# Patient Record
Sex: FEMALE | Race: WHITE
[De-identification: names, ages, dates, MRNs, and addresses within clinical notes are randomized per-mention and may not be internally consistent; named-entity substitution may affect disease eponyms.]

---

## 2017-03-23 ENCOUNTER — HOSPITAL ENCOUNTER (OUTPATIENT)
Dept: HOSPITAL 45 - C.PAPS | Age: 22
Discharge: HOME | End: 2017-03-23
Attending: OBSTETRICS & GYNECOLOGY
Payer: COMMERCIAL

## 2017-03-23 DIAGNOSIS — Z12.4: ICD-10-CM

## 2017-03-23 DIAGNOSIS — D06.9: Primary | ICD-10-CM

## 2017-09-05 ENCOUNTER — HOSPITAL ENCOUNTER (OUTPATIENT)
Dept: HOSPITAL 45 - C.PAPS | Age: 22
Discharge: HOME | End: 2017-09-05
Attending: OBSTETRICS & GYNECOLOGY
Payer: COMMERCIAL

## 2017-09-05 DIAGNOSIS — D06.9: Primary | ICD-10-CM

## 2018-05-05 ENCOUNTER — HOSPITAL ENCOUNTER (EMERGENCY)
Dept: HOSPITAL 45 - C.EDB | Age: 23
Discharge: HOME | End: 2018-05-05
Payer: COMMERCIAL

## 2018-05-05 VITALS — DIASTOLIC BLOOD PRESSURE: 78 MMHG | OXYGEN SATURATION: 99 % | HEART RATE: 82 BPM | SYSTOLIC BLOOD PRESSURE: 133 MMHG

## 2018-05-05 VITALS
BODY MASS INDEX: 26.89 KG/M2 | BODY MASS INDEX: 26.89 KG/M2 | WEIGHT: 136.95 LBS | WEIGHT: 136.95 LBS | HEIGHT: 60 IN | HEIGHT: 60 IN

## 2018-05-05 VITALS — TEMPERATURE: 98.24 F

## 2018-05-05 DIAGNOSIS — Z79.899: ICD-10-CM

## 2018-05-05 DIAGNOSIS — N39.0: Primary | ICD-10-CM

## 2018-05-05 NOTE — EMERGENCY ROOM VISIT NOTE
History


First contact with patient:  04:53


Chief Complaint:  URINARY SYMPTOMS


Stated Complaint:  UTI SYMPTOMS,BLOOD/TISSUE IN URINE


Nursing Triage Summary:  


pt has pmhx of uti, pt c/o urgency, frequency, burning, bladder hurts, peeing 


blood and tissue





History of Present Illness


The patient is a 22 year old female who presents to the Emergency Room with 

complaints of urinary frequency, urgency and dysuria with hematuria for the 

past day.  Patient has a history of UTIs.  She has had 2 this past year.  

Patient denies back pain, fevers, vomiting,Nausea, vomiting, flank pain, 

vaginal itching or discharge.  Patient took an  Pyridium.





Review of Systems


A 6 system review of systems was completed with positives and pertinent 

negatives listed in the HPI.





Past Medical/Surgical History


UTIs





Social History


Smoking Status:  Never Smoker


Smokeless Tobacco Use:  No


Marital Status:  in relationship





Current/Historical Medications


Scheduled


Bupropion (Wellbutrin-Xl), 300 MG PO QAM


Cephalexin Monohydrate (Keflex), 500 MG PO BID


Levonorgestrel-Ethinyl Estradi (Jolessa), 1 TAB PO QAM


Methylphenidate Hcl (Concerta), 54 MG PO QAM


Phenazopyridine HCl (Pyridium), 200 MG PO TID





Physical Exam


Vital Signs











  Date Time  Temp Pulse Resp B/P (MAP) Pulse Ox O2 Delivery O2 Flow Rate FiO2


 


18 04:53 36.8 90 18 145/88 97 Room Air  











Physical Exam


VITALS: Vitals are noted on the nurse's note and reviewed by myself.  Vital 

signs stable.


GENERAL: Pleasant female, in no acute distress, nondiaphoretic, well-developed 

well-nourished.


SKIN: Capillary reflex less than 2 seconds.


HEENT: Normocephalic.  PERRLA.  EOMI.  Nares patent.  Mucous membranes moist.  

Neck is supple without nuchal rigidity.


HEART: Regular rate and rhythm without murmurs gallops or rubs.


LUNGS: Clear to auscultation bilaterally without wheezes, rales or rhonchi.  No 

retractions or accessory muscle use.


ABDOMEN: Positive bowel sounds x 4.  Normal tympanic percussion.  Soft, 

nontender, without masses or organomegaly. Almaguer sign negative.  No guarding 

or rebound tenderness no CVA tenderness.


MUSCULOSKELETAL: No gross musculoskeletal defects.  No pedal edema.  No calf 

tenderness.


NEURO: Patient was alert and oriented to person place and time.  Normal 

sensation to light and sharp touch. No focal neurological deficits.





Medical Decision & Procedures


Laboratory Results











Test


  18


05:10


 


Urine Color ORANGE 


 


Urine Appearance CLOUDY (CLEAR) 


 


Urine pH  (4.5-7.5) 


 


Urine Specific Gravity


  1.007


(1.000-1.030)


 


Urine Protein NEG (NEG) 


 


Urine Glucose (UA)  (NEG) 


 


Urine Ketones  (NEG) 


 


Urine Occult Blood  (NEG) 


 


Urine Nitrite  (NEG) 


 


Urine Bilirubin  (NEG) 


 


Urine Urobilinogen  (NEG) 


 


Urine Leukocyte Esterase  (NEG) 


 


Urine RBC


  10-30 /hpf


(0-4)


 


Urine WBC


  10-30 /hpf


(0-5)


 


Urine Epithelial Cells >30 /lpf (0-5) 


 


Urine Bacteria 1+ (NEG) 


 


Urine Pregnancy Test NEG (NEG) 











ED Course


Prior records reviewed and summarized as above.


Triage Nursing notes reviewed.


Additional history obtained from boyfriend.





The patient's history was concerning for urinary symptom





Differential diagnosis:


Etiologies such as UTI, cystitis, renal colic, pyelonephritis as well as others 

were entertained..





Physical examination:


The physical examination was consistent with UTI





ER treatment provided:


Keflex, Pyridium


On reassessment the patient felt better.





Diagnostics interpreted by me:


The labs revealed urine consistent with infection and sent for culture.  

Patient was also symptomatic.  Negative hCG





This appears to be UTI.  Patient is afebrile nontoxic.  She had no CVA 

tenderness.  She is advised to take medications as directed and drink plenty of 

fluids to flush her bladder.  She is advised follow-up family care in a few 

days here in the ER sooner for abdominal pain, fevers, vomiting, flank pain, 

worsening signs or symptoms or as needed.


By the evaluation outlined above emergent etiologies such as pyelonephritis, as 

well as others were deemed relatively unlikely.





The pt informed about the findings as listed above. All questions were answered 

and  pleased with the treatment. Return instructions were outlined and the 

patient was discharged in stable condition.





Outpatient prescription management:


Keflex, Pyridium





Referral:


The patient was referred back to  primary care physician for follow-up in 2 to 

3 days for a recheck of the current condition.





The chart was completed utilizing Dragon Speech voice recognition software.   

Grammatical errors, random word insertions, pronoun errors, and incomplete 

sentences are an occassional consequence of this system due to software 

limitations, ambient noise, and hardware issues.  Any formal questions or 

concerns about the content, text, or information contained within the body of 

this dictation should be directly addressed to the physician assistant for 

clarification.





Medical Decision


As above





Medication Reconcilliation


Current Medication List:  was personally reviewed by me





Blood Pressure Screening


Patient's blood pressure:  Normal blood pressure





Impression





 Primary Impression:  


 Urinary tract infection





Departure Information


Dispostion


Home / Self-Care





Condition


GOOD





Prescriptions





Phenazopyridine HCl (Pyridium) 200 Mg Tab


200 MG PO TID for 2 Days, #6 TAB


   Prov: Latrice Luna .ROS         18 


Cephalexin Monohydrate (Keflex) 500 Mg Cap


500 MG PO BID for 7 Days, #14 CAP


   Prov: Latrice Luna .ROS         18





Referrals


Bridgett Lucas C.R.N.P. (PCP)





Patient Instructions


My [facility]





Additional Instructions








Keflex 500 mg: Take one pill twice daily for 7 days for your urine infection.  

All antibiotics can cause diarrhea.  If this occurs and you feel worse or it 

does not resolve in 1-2 days follow up with your doctor or return to the 

Emergency Department as this could be signs of serious underlying problems.  

Any medication can cause an allergic reaction, stop the pills immediately and 

return to the ER for rash, hives, breathing difficulties, or swelling.





Pyridium 200mg: Take one pill three times daily as needed for urinary 

discomfort.  This medication will turn your urine orange.  This is normal and 

nothing to be concerned about.


 


Ibuprofen(Motrin, Advil) may be used for fever or pain.  Use 600mg every six 

hours as needed.  Take with food.  Avoid using more than 2400mg in a 24 hour 

period.  Do not use 2400mg per day for more than three consecutive days without 

physician direction.  Prolonged inappropriate use can lead to stomach upset or 

ulcers. 


(AND/OR)


Acetaminophen(Tylenol) may be used for fever or pain.  Use 1000mg every six 

hours as needed.  Avoid using more than 3000mg in a 24 hour period.  





Rest and drink plenty of fluids as tolerated.  Slow sips of water or sports 

drinks are recommended instead of large amounts all at once.  





Continue current medications.





Once your stomach is settled start with a clear liquid diet (jello, soup broth, 

etc.) and then advance as tolerated.  You should avoid full, heavy meals for 

about 24 hrs from the time your symptoms resolved.  





Return to the ER immediately for worsening or persistent abdominal/back pain, 

vomiting, fevers, worsening of your condition, or as needed.





Follow up with your primary physician within 2-3 days for a recheck of the 

current condition.





Problem Qualifiers








 Primary Impression:  


 Urinary tract infection


 Urinary tract infection type:  acute cystitis  Hematuria presence:  with 

hematuria  Qualified Codes:  N30.01 - Acute cystitis with hematuria

## 2018-05-07 NOTE — PHARMACY PROGRESS NOTE
ED Pharmacist Culture FollowUp


Date of Service:


May 7, 2018.





Patient was sent home with a prescription for Cephalexin 500 mg BID x 7 days, 

which should cover the E. coli growing from the patient's urine culture.